# Patient Record
Sex: FEMALE | Race: WHITE | NOT HISPANIC OR LATINO
[De-identification: names, ages, dates, MRNs, and addresses within clinical notes are randomized per-mention and may not be internally consistent; named-entity substitution may affect disease eponyms.]

---

## 2018-07-12 ENCOUNTER — APPOINTMENT (OUTPATIENT)
Dept: HUMAN REPRODUCTION | Facility: CLINIC | Age: 38
End: 2018-07-12
Payer: COMMERCIAL

## 2018-07-12 PROCEDURE — 36415 COLL VENOUS BLD VENIPUNCTURE: CPT

## 2018-07-12 PROCEDURE — 76830 TRANSVAGINAL US NON-OB: CPT

## 2018-07-12 PROCEDURE — 99205 OFFICE O/P NEW HI 60 MIN: CPT

## 2018-07-27 ENCOUNTER — OTHER (OUTPATIENT)
Age: 38
End: 2018-07-27

## 2018-07-30 ENCOUNTER — APPOINTMENT (OUTPATIENT)
Dept: HUMAN REPRODUCTION | Facility: CLINIC | Age: 38
End: 2018-07-30
Payer: COMMERCIAL

## 2018-07-30 ENCOUNTER — TRANSCRIPTION ENCOUNTER (OUTPATIENT)
Age: 38
End: 2018-07-30

## 2018-07-30 PROCEDURE — 81025 URINE PREGNANCY TEST: CPT

## 2018-07-30 PROCEDURE — 76830 TRANSVAGINAL US NON-OB: CPT

## 2018-07-30 PROCEDURE — 36415 COLL VENOUS BLD VENIPUNCTURE: CPT

## 2018-07-30 PROCEDURE — 99214 OFFICE O/P EST MOD 30 MIN: CPT | Mod: 25

## 2018-08-03 ENCOUNTER — APPOINTMENT (OUTPATIENT)
Dept: HUMAN REPRODUCTION | Facility: CLINIC | Age: 38
End: 2018-08-03
Payer: COMMERCIAL

## 2018-08-03 PROCEDURE — 99213 OFFICE O/P EST LOW 20 MIN: CPT | Mod: 25

## 2018-08-03 PROCEDURE — 76830 TRANSVAGINAL US NON-OB: CPT

## 2018-08-03 PROCEDURE — 36415 COLL VENOUS BLD VENIPUNCTURE: CPT

## 2018-08-30 ENCOUNTER — APPOINTMENT (OUTPATIENT)
Dept: HUMAN REPRODUCTION | Facility: CLINIC | Age: 38
End: 2018-08-30
Payer: COMMERCIAL

## 2018-08-30 PROCEDURE — 99215 OFFICE O/P EST HI 40 MIN: CPT | Mod: 25

## 2018-08-30 PROCEDURE — 76830 TRANSVAGINAL US NON-OB: CPT

## 2018-08-30 PROCEDURE — 36415 COLL VENOUS BLD VENIPUNCTURE: CPT

## 2018-09-05 ENCOUNTER — OTHER (OUTPATIENT)
Age: 38
End: 2018-09-05

## 2018-09-05 ENCOUNTER — APPOINTMENT (OUTPATIENT)
Dept: HUMAN REPRODUCTION | Facility: CLINIC | Age: 38
End: 2018-09-05
Payer: COMMERCIAL

## 2018-09-05 PROCEDURE — 76830 TRANSVAGINAL US NON-OB: CPT

## 2018-09-05 PROCEDURE — 99213 OFFICE O/P EST LOW 20 MIN: CPT | Mod: 25

## 2018-09-05 PROCEDURE — 36415 COLL VENOUS BLD VENIPUNCTURE: CPT

## 2018-09-10 ENCOUNTER — APPOINTMENT (OUTPATIENT)
Dept: HUMAN REPRODUCTION | Facility: CLINIC | Age: 38
End: 2018-09-10
Payer: COMMERCIAL

## 2018-09-10 PROCEDURE — 36415 COLL VENOUS BLD VENIPUNCTURE: CPT

## 2018-09-10 PROCEDURE — 76830 TRANSVAGINAL US NON-OB: CPT

## 2018-09-10 PROCEDURE — 99213 OFFICE O/P EST LOW 20 MIN: CPT | Mod: 25

## 2018-09-12 ENCOUNTER — APPOINTMENT (OUTPATIENT)
Dept: HUMAN REPRODUCTION | Facility: CLINIC | Age: 38
End: 2018-09-12

## 2018-09-17 ENCOUNTER — APPOINTMENT (OUTPATIENT)
Dept: HUMAN REPRODUCTION | Facility: CLINIC | Age: 38
End: 2018-09-17

## 2018-09-20 ENCOUNTER — APPOINTMENT (OUTPATIENT)
Dept: ANTEPARTUM | Facility: CLINIC | Age: 38
End: 2018-09-20

## 2018-09-20 ENCOUNTER — APPOINTMENT (OUTPATIENT)
Dept: MATERNAL FETAL MEDICINE | Facility: CLINIC | Age: 38
End: 2018-09-20
Payer: COMMERCIAL

## 2018-09-20 ENCOUNTER — ASOB RESULT (OUTPATIENT)
Age: 38
End: 2018-09-20

## 2018-09-20 VITALS
HEIGHT: 62 IN | DIASTOLIC BLOOD PRESSURE: 86 MMHG | WEIGHT: 223.6 LBS | SYSTOLIC BLOOD PRESSURE: 122 MMHG | BODY MASS INDEX: 41.15 KG/M2

## 2018-09-20 PROCEDURE — 99242 OFF/OP CONSLTJ NEW/EST SF 20: CPT | Mod: 25

## 2018-11-19 ENCOUNTER — APPOINTMENT (OUTPATIENT)
Dept: HUMAN REPRODUCTION | Facility: CLINIC | Age: 38
End: 2018-11-19
Payer: COMMERCIAL

## 2018-11-19 PROCEDURE — 36415 COLL VENOUS BLD VENIPUNCTURE: CPT

## 2018-11-19 PROCEDURE — 99213 OFFICE O/P EST LOW 20 MIN: CPT | Mod: 25

## 2018-11-19 PROCEDURE — 76830 TRANSVAGINAL US NON-OB: CPT

## 2018-11-27 ENCOUNTER — APPOINTMENT (OUTPATIENT)
Dept: HUMAN REPRODUCTION | Facility: CLINIC | Age: 38
End: 2018-11-27
Payer: COMMERCIAL

## 2018-11-27 PROCEDURE — 58340 CATHETER FOR HYSTEROGRAPHY: CPT

## 2018-11-27 PROCEDURE — 99213 OFFICE O/P EST LOW 20 MIN: CPT | Mod: 25

## 2018-11-27 PROCEDURE — 76831 ECHO EXAM UTERUS: CPT

## 2018-12-18 ENCOUNTER — APPOINTMENT (OUTPATIENT)
Dept: HUMAN REPRODUCTION | Facility: CLINIC | Age: 38
End: 2018-12-18
Payer: COMMERCIAL

## 2018-12-18 PROCEDURE — 99213 OFFICE O/P EST LOW 20 MIN: CPT | Mod: 25

## 2018-12-18 PROCEDURE — 36415 COLL VENOUS BLD VENIPUNCTURE: CPT

## 2018-12-18 PROCEDURE — 76830 TRANSVAGINAL US NON-OB: CPT

## 2018-12-28 ENCOUNTER — APPOINTMENT (OUTPATIENT)
Dept: HUMAN REPRODUCTION | Facility: CLINIC | Age: 38
End: 2018-12-28
Payer: COMMERCIAL

## 2018-12-28 PROCEDURE — 76830 TRANSVAGINAL US NON-OB: CPT

## 2018-12-28 PROCEDURE — 99213 OFFICE O/P EST LOW 20 MIN: CPT | Mod: 25

## 2018-12-28 PROCEDURE — 36415 COLL VENOUS BLD VENIPUNCTURE: CPT

## 2018-12-29 ENCOUNTER — APPOINTMENT (OUTPATIENT)
Dept: HUMAN REPRODUCTION | Facility: CLINIC | Age: 38
End: 2018-12-29
Payer: COMMERCIAL

## 2018-12-29 PROCEDURE — 76830 TRANSVAGINAL US NON-OB: CPT

## 2018-12-29 PROCEDURE — 58322 ARTIFICIAL INSEMINATION: CPT

## 2018-12-29 PROCEDURE — 89261 SPERM ISOLATION COMPLEX: CPT

## 2018-12-29 PROCEDURE — 99213 OFFICE O/P EST LOW 20 MIN: CPT | Mod: 25

## 2019-01-17 ENCOUNTER — APPOINTMENT (OUTPATIENT)
Dept: HUMAN REPRODUCTION | Facility: CLINIC | Age: 39
End: 2019-01-17
Payer: COMMERCIAL

## 2019-01-17 PROCEDURE — 99213 OFFICE O/P EST LOW 20 MIN: CPT | Mod: 25

## 2019-01-17 PROCEDURE — 36415 COLL VENOUS BLD VENIPUNCTURE: CPT

## 2019-01-17 PROCEDURE — 76817 TRANSVAGINAL US OBSTETRIC: CPT

## 2019-01-22 ENCOUNTER — APPOINTMENT (OUTPATIENT)
Dept: HUMAN REPRODUCTION | Facility: CLINIC | Age: 39
End: 2019-01-22
Payer: COMMERCIAL

## 2019-01-22 PROCEDURE — 36415 COLL VENOUS BLD VENIPUNCTURE: CPT

## 2019-01-22 PROCEDURE — 76817 TRANSVAGINAL US OBSTETRIC: CPT

## 2019-01-22 PROCEDURE — 99213 OFFICE O/P EST LOW 20 MIN: CPT | Mod: 25

## 2019-01-29 ENCOUNTER — APPOINTMENT (OUTPATIENT)
Dept: HUMAN REPRODUCTION | Facility: CLINIC | Age: 39
End: 2019-01-29
Payer: COMMERCIAL

## 2019-01-29 PROCEDURE — 76817 TRANSVAGINAL US OBSTETRIC: CPT

## 2019-01-29 PROCEDURE — 99213 OFFICE O/P EST LOW 20 MIN: CPT | Mod: 25

## 2019-01-29 PROCEDURE — 36415 COLL VENOUS BLD VENIPUNCTURE: CPT

## 2023-06-23 ENCOUNTER — APPOINTMENT (OUTPATIENT)
Dept: BARIATRICS/WEIGHT MGMT | Facility: CLINIC | Age: 43
End: 2023-06-23
Payer: COMMERCIAL

## 2023-06-23 VITALS — WEIGHT: 245 LBS | BODY MASS INDEX: 43.41 KG/M2 | HEIGHT: 63 IN

## 2023-06-23 DIAGNOSIS — Z87.42 PERSONAL HISTORY OF OTHER DISEASES OF THE FEMALE GENITAL TRACT: ICD-10-CM

## 2023-06-23 PROCEDURE — 99205 OFFICE O/P NEW HI 60 MIN: CPT | Mod: 95

## 2023-06-23 RX ORDER — CLOMIPHENE CITRATE 50 MG/1
50 TABLET ORAL DAILY
Qty: 5 | Refills: 0 | Status: DISCONTINUED | COMMUNITY
Start: 2018-08-30 | End: 2023-06-23

## 2023-06-23 RX ORDER — PEN NEEDLE, DIABETIC 32 GX 1/6"
32G X 4 MM NEEDLE, DISPOSABLE MISCELLANEOUS
Qty: 90 | Refills: 1 | Status: DISCONTINUED | COMMUNITY
Start: 2023-06-23 | End: 2023-06-23

## 2023-06-23 RX ORDER — LEVOTHYROXINE SODIUM 0.05 MG/1
50 TABLET ORAL
Qty: 30 | Refills: 6 | Status: DISCONTINUED | COMMUNITY
Start: 2018-07-27 | End: 2023-06-23

## 2023-06-23 RX ORDER — CHORIOGONADOTROPIN ALFA 250 UG/.5ML
250 INJECTION, SOLUTION SUBCUTANEOUS
Qty: 1 | Refills: 0 | Status: DISCONTINUED | COMMUNITY
Start: 2018-09-05 | End: 2023-06-23

## 2023-06-23 RX ORDER — CHORIOGONADOTROPIN ALFA 250 UG/.5ML
250 INJECTION, SOLUTION SUBCUTANEOUS
Qty: 1 | Refills: 1 | Status: DISCONTINUED | COMMUNITY
Start: 2018-11-19 | End: 2023-06-23

## 2023-06-23 RX ORDER — CLOMIPHENE CITRATE 50 MG/1
50 TABLET ORAL DAILY
Qty: 5 | Refills: 0 | Status: DISCONTINUED | COMMUNITY
Start: 2018-11-19 | End: 2023-06-23

## 2023-06-23 NOTE — REASON FOR VISIT
[Home] : at home, [unfilled] , at the time of the visit. [Medical Office: ([facility])___] : at the medical office located in  [Patient] : the patient [Initial Evaluation] : an initial evaluation

## 2023-06-23 NOTE — HISTORY OF PRESENT ILLNESS
[FreeTextEntry1] : Bariatric surgery history: none\par Obesity co- morbidities: gerd on ppi\par Comorbidities improved or resolved: none\par Anti-obesity medications: none\par Obesity medication side effects: none\par \par PATIENT WAS NOTIFIED THAT ANYTHING WE DISCUSSED IN OUR MEETING TODAY MAY BE REFLECTED IN WRITING IN THE VISIT NOTE WHICH WILL BE AVAILABLE TO OTHER MEDICAL PROVIDERS TO REVIEW AS PART OF ROUTINE PATIENT CARE.  PATIENT VERBALLY AGREED. \par \par Ms. LYLY GOMEZ is a 42 year year old female who presents for evaluation and treatment of Class 3 obesity. 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  \par \par Had labs done in May, 5.8%.  new diagnosis\par \par Weight History:\par Lowest adult weight: 135\par Highest adult weight: 250\par \par Has lost 20 pounds over the past year.\par \par Obesity began in college.  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  Weight gain has occurred with: in college, started lexapro and had difficult break up and gained weight.   Lost 65 lbs - with daily cardio - running, and some strength training - kickboxing classes, when she was in college after stopping lexapro down to 135 from 200. With work/med school, and pregnant w twins, max 250#.  In the past year, has gone from 245 to 220s.  Significant low back pain since 3rd baby, went to PT for a while.  \par In June last year 245, down to 220 with WW, and then went on vacation, and then "let loose" and has regained most of the weight.  \par \par Started taking wegovy 0.25mg x 5 weeks, and had improved cravings at night significantly.   lost about 4 lbs.  then was out of supply. \par \par Past weight loss attempts include:  WW, phentermine. These have produced a maximum of 25 pounds of weight loss.  \par Anti-obesity medications in the past: phentermine - no weight loss while taking consistently.  Wegovy - since 5 weeks ago, 0.25mg.  \par \par Sleep: 5-8 hours.  irregular due to shifts.  +snore.  occasionally naps. \par \par Has 1-2 (varies) regular meals a day.  Irregular eating habits\par \par Diet history:\par wakes up at:\par B: eat minimally during the day.  dry cereal - puffins.  or yogurt.  \par L: grilled cheese or a few teaspoons of peanut butter\par D: starbucks - egg white bites, or ramen, or Romansh food\par \par when she works - yogurt oikos 20g protein, and little roll ramirez w PB&J and coffee.  with think thin 150 margo, 10g protein.  after work, fast food or ice cream\par \par Doesn't cook anymore. \par \par snacks: yes\par eating after dinner: yes ice cream, cookies, chocolate\par overeating episodes: yes, improved on wegovy. 3-4 days a week. \par \par Sodas/fast food/processed foods: 2-3 times a week \par \par Water intake per day: 2 cups per day\par coffee 2 cups per day\par soda 1-2 cups per day\par seltzer water ex. eulogio - 1-2 per day. \par \par Physical activity:\par Patient enjoys: gym, biking\par Current physical activity: daily activities only\par \par

## 2023-06-23 NOTE — ASSESSMENT
[FreeTextEntry1] : 42 y.o F w Class 3 obesity, preDM. presents for weight management\par \par - discussed incr water intake\par - trying to wait and see if  will come back to more shifts or will have to find another to help out\par - wanting to try saxenda- will send\par - discussed eating more meals during the day will be very crucial to decr binge eating behaviors at night.  can see Dr. Pineda, will email handouts as well\par - incr activity as tolerated but above is priority\par - sleep study - defers at this time, 3/8 risk factors\par \par f/u 6 weeks or next avail

## 2023-07-26 ENCOUNTER — APPOINTMENT (OUTPATIENT)
Dept: BARIATRICS/WEIGHT MGMT | Facility: CLINIC | Age: 43
End: 2023-07-26
Payer: COMMERCIAL

## 2023-07-26 VITALS — BODY MASS INDEX: 41.82 KG/M2 | HEIGHT: 63 IN | WEIGHT: 236 LBS

## 2023-07-26 PROCEDURE — 97802 MEDICAL NUTRITION INDIV IN: CPT | Mod: 95

## 2023-08-03 ENCOUNTER — APPOINTMENT (OUTPATIENT)
Dept: BARIATRICS/WEIGHT MGMT | Facility: CLINIC | Age: 43
End: 2023-08-03
Payer: COMMERCIAL

## 2023-08-03 ENCOUNTER — OUTPATIENT (OUTPATIENT)
Dept: OUTPATIENT SERVICES | Facility: HOSPITAL | Age: 43
LOS: 1 days | End: 2023-08-03
Payer: COMMERCIAL

## 2023-08-03 VITALS — BODY MASS INDEX: 41.1 KG/M2 | WEIGHT: 232 LBS

## 2023-08-03 DIAGNOSIS — E66.9 OBESITY, UNSPECIFIED: ICD-10-CM

## 2023-08-03 DIAGNOSIS — R73.03 PREDIABETES: ICD-10-CM

## 2023-08-03 PROCEDURE — 99214 OFFICE O/P EST MOD 30 MIN: CPT | Mod: 95

## 2023-08-03 PROCEDURE — G0463: CPT

## 2023-08-03 NOTE — HISTORY OF PRESENT ILLNESS
[FreeTextEntry1] : Bariatric surgery history: none Obesity co- morbidities: gerd on ppi Comorbidities improved or resolved: none Anti-obesity medications: saxenda Obesity medication side effects: none  PATIENT WAS NOTIFIED THAT ANYTHING WE DISCUSSED IN OUR MEETING TODAY MAY BE REFLECTED IN WRITING IN THE VISIT NOTE WHICH WILL BE AVAILABLE TO OTHER MEDICAL PROVIDERS TO REVIEW AS PART OF ROUTINE PATIENT CARE.  PATIENT VERBALLY AGREED.   Ms. LYLY GOMEZ is a 42 year year old female who presents for evaluation and treatment of Class 3 obesity.   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    Had labs done in May, 5.8%.  new diagnosis  Weight History: Lowest adult weight: 135 Highest adult weight: 250  Has lost 20 pounds over the past year.  Interim: - started saxenda around July 4th - she's currently on 1.8mg.  On 2.4mg she was having more gi side effects - seeing progress - spoke to RDN  Obesity began in college.  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  Weight gain has occurred with: in college, started lexapro and had difficult break up and gained weight.   Lost 65 lbs - with daily cardio - running, and some strength training - kickboxing classes, when she was in college after stopping lexapro down to 135 from 200. With work/med school, and pregnant w twins, max 250#.  In the past year, has gone from 245 to 220s.  Significant low back pain since 3rd baby, went to PT for a while.   In June last year 245, down to 220 with WW, and then went on vacation, and then "let loose" and has regained most of the weight.    Started taking wegovy 0.25mg x 5 weeks, and had improved cravings at night significantly.   lost about 4 lbs.  then was out of supply.   Past weight loss attempts include:  WW, phentermine. These have produced a maximum of 25 pounds of weight loss.   Anti-obesity medications in the past: phentermine - no weight loss while taking consistently.  Wegovy - since 5 weeks ago, 0.25mg.    Sleep: 5-8 hours.  irregular due to shifts.  +snore.  occasionally naps.   Has 1-2 (varies) regular meals a day.  Irregular eating habits  Diet history: wakes up at: B: eat minimally during the day.  dry cereal - puffins.  or yogurt.   L: grilled cheese or a few teaspoons of peanut butter D: starbucks - egg white bites, or ramen, or [place] food  when she works - yogurt oikos 20g protein, and little roll ramirez w PB&J and coffee.  with think thin 150 margo, 10g protein.  after work, fast food or ice cream  Doesn't cook anymore.   snacks: yes eating after dinner: yes ice cream, cookies, chocolate overeating episodes: yes, improved on wegovy. 3-4 days a week.   Sodas/fast food/processed foods: 2-3 times a week   Water intake per day: 2 cups per day coffee 2 cups per day soda 1-2 cups per day seltzer water ex. eulogio - 1-2 per day.   Physical activity: Patient enjoys: gym, biking Current physical activity: daily activities only

## 2023-08-03 NOTE — ASSESSMENT
[FreeTextEntry1] : 42 y.o F w Class 3 obesity, preDM. presents for weight management  - saxenda 1.8 mg.  - discussed incr water intake - trying to wait and see if  will come back to more shifts or will have to find another to help out - discussed eating more meals during the day will be very crucial to decr binge eating behaviors at night.  can see Dr. Pineda - she's been doing that more often, definitely breakfast - incr activity as tolerated but above is priority - sleep study - defers at this time, 3/8 risk factors  f/u 6 weeks or next avail

## 2023-08-03 NOTE — REASON FOR VISIT
[Home] : at home, [unfilled] , at the time of the visit. [Medical Office: ([facility])___] : at the medical office located in  [Patient] : the patient [Follow-Up] : a follow-up visit

## 2023-08-04 DIAGNOSIS — I10 ESSENTIAL (PRIMARY) HYPERTENSION: ICD-10-CM

## 2023-08-14 ENCOUNTER — APPOINTMENT (OUTPATIENT)
Dept: BARIATRICS/WEIGHT MGMT | Facility: CLINIC | Age: 43
End: 2023-08-14
Payer: COMMERCIAL

## 2023-08-14 VITALS — BODY MASS INDEX: 41.64 KG/M2 | HEIGHT: 63 IN | WEIGHT: 235 LBS

## 2023-08-14 DIAGNOSIS — Z78.9 OTHER SPECIFIED HEALTH STATUS: ICD-10-CM

## 2023-08-14 PROCEDURE — 90791 PSYCH DIAGNOSTIC EVALUATION: CPT | Mod: 95

## 2023-08-14 NOTE — DISCUSSION/SUMMARY
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 Humphreys for treatment of BED. [de-identified] : BED [de-identified] : cessation of binge eating

## 2023-08-14 NOTE — REASON FOR VISIT
[Home] : at home, [unfilled] , at the time of the visit. [Other Location: e.g. Home (Enter Location, City,State)___] : at [unfilled] [Patient] : the patient [Initial Consult] : an initial consult for [Morbid Obesity (BMI>40)] : morbid obesity (bmi>40) [Referring By:  ___] : ~Dino Ln~ was referred for psychological evaluation by Dr. HUTSON [de-identified] : for evaluation and tx of psyc sxs related to obesity [de-identified] : Confidentiality and its limitations were explained.

## 2023-08-14 NOTE — HISTORY OF PRESENT ILLNESS
[Binge Eating] : binge eating [Emotional Eating] : emotional eating  [Mindless Eating] : mindless eating [Quantity Eating] : quantity eating [Poor Choices] : poor choices [de-identified] : Pt referred by Dr. Humphreys.  Started Saxenda x 1 month ago and has lost 6 lbs.  Reports binge eating at night which has lessened slightly on Saxenda.  Pt's motivation for weight loss is to avoid health issues and "to look better".   Per pt, her highest weight was 245 lbs most recently a few months ago and her lowest weight was 130 lbs at 29 yrs old.   Her stated goal weight is 150 lbs. [de-identified] : since high school, at times every night.  A typical binge would include a package of cookies or a pint or quart of ice cream.  Endorses feeling out of control, denies eating rapidly, endorses eating when not hungry and until uncomfortably full, eating alone or in front of  only because of shame, and feels disgusted afterwards. [de-identified] : A current typical day of eating is reported as follows: B: skips L:  half a sandwich D at 8pm:  eats something late, orders take out, sometimes has dinner and then snacks Drinks water, seltzer and coffee.

## 2023-08-14 NOTE — SOCIAL HISTORY
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

## 2023-08-14 NOTE — CURRENT PSYCHIATRIC SYMPTOMS
[Depressed Mood] : no depressed mood [Euphoria] : no euphoria [Dec Need For Sleep] : no decreased need for sleep [Thought Disorder] : ~T a thought disorder was not noted [Excessive Worry] : no excessive worries [Panic] : no panic disorder [de-identified] : poor sleep due to changing work schedule [FreeTextEntry1] : Saw a therapist in her 20s for depression secondary to a break up.  Took Lexapro for less than a year during that time.  Denies all other psyc tx hx.

## 2023-08-14 NOTE — PHYSICAL EXAM
[Normal] : good [AH] : no auditory hallucinations [VH] : no visual hallucinations [Suicidal Ideation] : no suicidal ideation [Homicidal Ideation] : no homicidal ideation [FreeTextEntry8] : "decent"

## 2023-09-05 ENCOUNTER — APPOINTMENT (OUTPATIENT)
Dept: BARIATRICS/WEIGHT MGMT | Facility: CLINIC | Age: 43
End: 2023-09-05
Payer: COMMERCIAL

## 2023-09-05 VITALS — WEIGHT: 231 LBS | BODY MASS INDEX: 40.93 KG/M2 | HEIGHT: 63 IN

## 2023-09-05 PROCEDURE — 90832 PSYTX W PT 30 MINUTES: CPT | Mod: 95

## 2023-09-05 PROCEDURE — 97803 MED NUTRITION INDIV SUBSEQ: CPT | Mod: 95

## 2023-09-05 NOTE — DISCUSSION/SUMMARY
[Behavior plan developed] : Behavior plan developed [Coping skills training to overcome social/emotional barriers to adherence] : Coping skills training to overcome social/emotional barriers to adherence [Conventional grooming and hygiene] : Conventional grooming and hygiene [Calm, cooperative] : Calm, cooperative [No unusual behaviors, movements, etc.] : No unusual behaviors, movements, etc. 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 Humphreys for treatment of BED.  Prescribed Saxenda since July 2023. [de-identified] : BED [FreeTextEntry3] : Follow up with writer helene 4 wks [de-identified] : cessation of binge eating

## 2023-09-05 NOTE — REASON FOR VISIT
[Follow-Up Visit] : a follow-up visit for [Morbid Obesity (BMI>40)] : morbid obesity (bmi>40) [Home] : at home, [unfilled] , at the time of the visit. [Other Location: e.g. Home (Enter Location, City,State)___] : at [unfilled] [Patient] : the patient [Referring By:  ___] : ~Dino Ln~ was referred for psychological evaluation by Dr. HUTSON [de-identified] : for evaluation and tx of psyc sxs related to obesity [de-identified] : Confidentiality and its limitations were explained.

## 2023-09-28 ENCOUNTER — APPOINTMENT (OUTPATIENT)
Dept: BARIATRICS/WEIGHT MGMT | Facility: CLINIC | Age: 43
End: 2023-09-28
Payer: COMMERCIAL

## 2023-09-28 ENCOUNTER — OUTPATIENT (OUTPATIENT)
Dept: OUTPATIENT SERVICES | Facility: HOSPITAL | Age: 43
LOS: 1 days | End: 2023-09-28
Payer: COMMERCIAL

## 2023-09-28 VITALS — BODY MASS INDEX: 41.1 KG/M2 | WEIGHT: 232 LBS

## 2023-09-28 DIAGNOSIS — I10 ESSENTIAL (PRIMARY) HYPERTENSION: ICD-10-CM

## 2023-09-28 PROCEDURE — 99214 OFFICE O/P EST MOD 30 MIN: CPT | Mod: 95

## 2023-09-28 PROCEDURE — G0463: CPT

## 2023-10-02 DIAGNOSIS — E66.9 OBESITY, UNSPECIFIED: ICD-10-CM

## 2023-10-02 DIAGNOSIS — R73.03 PREDIABETES: ICD-10-CM

## 2023-10-11 ENCOUNTER — APPOINTMENT (OUTPATIENT)
Dept: BARIATRICS/WEIGHT MGMT | Facility: CLINIC | Age: 43
End: 2023-10-11
Payer: COMMERCIAL

## 2023-10-11 VITALS — BODY MASS INDEX: 40.4 KG/M2 | WEIGHT: 228 LBS | HEIGHT: 63 IN

## 2023-10-11 PROCEDURE — 90832 PSYTX W PT 30 MINUTES: CPT | Mod: 95,GT

## 2023-11-27 ENCOUNTER — APPOINTMENT (OUTPATIENT)
Dept: BARIATRICS/WEIGHT MGMT | Facility: CLINIC | Age: 43
End: 2023-11-27

## 2023-11-27 ENCOUNTER — APPOINTMENT (OUTPATIENT)
Dept: BARIATRICS/WEIGHT MGMT | Facility: CLINIC | Age: 43
End: 2023-11-27
Payer: COMMERCIAL

## 2023-11-27 ENCOUNTER — OUTPATIENT (OUTPATIENT)
Dept: OUTPATIENT SERVICES | Facility: HOSPITAL | Age: 43
LOS: 1 days | End: 2023-11-27
Payer: COMMERCIAL

## 2023-11-27 VITALS — WEIGHT: 222 LBS | BODY MASS INDEX: 39.33 KG/M2

## 2023-11-27 DIAGNOSIS — I10 ESSENTIAL (PRIMARY) HYPERTENSION: ICD-10-CM

## 2023-11-27 PROCEDURE — G0463: CPT

## 2023-11-27 PROCEDURE — 99214 OFFICE O/P EST MOD 30 MIN: CPT | Mod: 95

## 2023-11-27 RX ORDER — LIRAGLUTIDE 6 MG/ML
18 INJECTION, SOLUTION SUBCUTANEOUS DAILY
Qty: 1 | Refills: 2 | Status: DISCONTINUED | COMMUNITY
Start: 2023-06-23 | End: 2023-11-27

## 2023-12-05 DIAGNOSIS — E66.01 MORBID (SEVERE) OBESITY DUE TO EXCESS CALORIES: ICD-10-CM

## 2023-12-05 DIAGNOSIS — R73.03 PREDIABETES: ICD-10-CM

## 2023-12-12 RX ORDER — PEN NEEDLE, DIABETIC 29 G X1/2"
32G X 4 MM NEEDLE, DISPOSABLE MISCELLANEOUS
Qty: 90 | Refills: 1 | Status: DISCONTINUED | COMMUNITY
Start: 2023-06-23 | End: 2023-12-12

## 2023-12-12 RX ORDER — SEMAGLUTIDE 1.7 MG/.75ML
1.7 INJECTION, SOLUTION SUBCUTANEOUS
Qty: 3 | Refills: 0 | Status: DISCONTINUED | COMMUNITY
Start: 2023-09-28 | End: 2023-12-12

## 2024-01-09 ENCOUNTER — APPOINTMENT (OUTPATIENT)
Dept: BARIATRICS/WEIGHT MGMT | Facility: CLINIC | Age: 44
End: 2024-01-09
Payer: COMMERCIAL

## 2024-01-09 ENCOUNTER — TRANSCRIPTION ENCOUNTER (OUTPATIENT)
Age: 44
End: 2024-01-09

## 2024-01-09 VITALS — WEIGHT: 216 LBS | BODY MASS INDEX: 38.27 KG/M2 | HEIGHT: 63 IN

## 2024-01-09 PROCEDURE — 99214 OFFICE O/P EST MOD 30 MIN: CPT | Mod: 95

## 2024-01-11 NOTE — ASSESSMENT
[FreeTextEntry1] : Bariatric surgery history: none Obesity co- morbidities: gerd on ppi Comorbidities improved or resolved: none Anti-obesity medications: wegovy Obesity medication side effects: none  Plan:  Discussed whole food, high fiber, lean animal protein would reduce animal protein to 4-6 ounce serving size a day  keep food journal  avoid added fat, sugar, refined carbohydrates  encouraged regular exercise- discussed planning in advance will order zepbound 5mg and dc wegovy, as patient is unable to tolerate.  f/u 2-3 weeks

## 2024-01-11 NOTE — REASON FOR VISIT
[Home] : at home, [unfilled] , at the time of the visit. [Other Location: e.g. Home (Enter Location, City,State)___] : at [unfilled] [Patient] : the patient [Self] : self [Follow-Up] : a follow-up visit

## 2024-01-11 NOTE — HISTORY OF PRESENT ILLNESS
[FreeTextEntry1] : This is a 43 year old female  being seen obesity followup visit.    Patient's weight down  She was sick for 2-3 weeks but all has resolved   Taking wegovy 1.7mg Severe fatigue and nausea the day after injection.     She is doing Factor meals Patient does not have time to cook.  with 3 young children  Exercise is sporadic.

## 2024-02-07 ENCOUNTER — APPOINTMENT (OUTPATIENT)
Dept: BARIATRICS/WEIGHT MGMT | Facility: CLINIC | Age: 44
End: 2024-02-07
Payer: COMMERCIAL

## 2024-02-07 VITALS — BODY MASS INDEX: 37.74 KG/M2 | WEIGHT: 213 LBS | HEIGHT: 63 IN

## 2024-02-07 PROCEDURE — 99214 OFFICE O/P EST MOD 30 MIN: CPT | Mod: 95

## 2024-02-07 NOTE — HISTORY OF PRESENT ILLNESS
[FreeTextEntry1] : This is a 43 year old female  being seen obesity followup visit.    Patient's weight down 3 pounds  she has started zepbound 5mg 2 weeks ago  after first injection noted back aches and posterior lymph nodes for 24 hours, denies fever/chills, cough, sob Patient took 2nd injection yesterday    Started thistle, really enjoying the meals, getting in more fiber and feels   kept a food journal tends to snack while watching tv at night ie: chips and cookies   She started walking this week, especially nice days  started wearing apple watch, tracking steps

## 2024-02-07 NOTE — ASSESSMENT
[FreeTextEntry1] : Bariatric surgery history: none Obesity co- morbidities: gerd on ppi Comorbidities improved or resolved: none Anti-obesity medications: wegovy Obesity medication side effects: none  Plan:  Discussed whole food, high fiber, lean animal protein would reduce animal protein to 4-6 ounce serving size a day  keep food journal  avoid added fat, sugar, refined carbohydrates  encouraged regular exercise continue zepbound 5mg and monitor for any immune side effects, patient will reach out with update. if tolerating, will advise to increase to 7.5mg  change night time behavior- drink tea, relax in a different room, add breathing stress reduction techniques   f/u 2-3 weeks

## 2024-02-15 ENCOUNTER — TRANSCRIPTION ENCOUNTER (OUTPATIENT)
Age: 44
End: 2024-02-15

## 2024-03-06 ENCOUNTER — OUTPATIENT (OUTPATIENT)
Dept: OUTPATIENT SERVICES | Facility: HOSPITAL | Age: 44
LOS: 1 days | End: 2024-03-06
Payer: COMMERCIAL

## 2024-03-06 ENCOUNTER — APPOINTMENT (OUTPATIENT)
Dept: BARIATRICS/WEIGHT MGMT | Facility: CLINIC | Age: 44
End: 2024-03-06
Payer: COMMERCIAL

## 2024-03-06 VITALS — WEIGHT: 208 LBS | BODY MASS INDEX: 36.86 KG/M2 | HEIGHT: 63 IN

## 2024-03-06 DIAGNOSIS — I10 ESSENTIAL (PRIMARY) HYPERTENSION: ICD-10-CM

## 2024-03-06 PROCEDURE — 99213 OFFICE O/P EST LOW 20 MIN: CPT | Mod: 95

## 2024-03-06 PROCEDURE — G0463: CPT

## 2024-03-07 NOTE — ASSESSMENT
[FreeTextEntry1] : Bariatric surgery history: none Obesity co- morbidities: gerd on ppi Comorbidities improved or resolved: none Anti-obesity medications: wegovy Obesity medication side effects: none  Plan:  Discussed whole food, high fiber, lean animal protein avoid red meat avoid processed/packaged foods keep food journal  avoid added fat, sugar, refined carbohydrates  discussed foods she can prepare at home to avoid eating out  encouraged regular exercise continue zepbound  7.5mg for now   f/u 4-6weeks

## 2024-03-07 NOTE — HISTORY OF PRESENT ILLNESS
[FreeTextEntry1] : This is a 43 year old female  being seen obesity followup visit.    Patient's weight down 5 pounds  increased zepbound to 7.5mg she noted some bloating and belching, but still says it's tolerable   yesterday food recall B: Greek from [employer] L: a couple bites of salad  D: salad with chicken   tracking steps, but has not been able to go for regimented exercise  5k steps on average

## 2024-03-20 DIAGNOSIS — R73.03 PREDIABETES: ICD-10-CM

## 2024-03-20 DIAGNOSIS — E66.01 MORBID (SEVERE) OBESITY DUE TO EXCESS CALORIES: ICD-10-CM

## 2024-03-27 ENCOUNTER — TRANSCRIPTION ENCOUNTER (OUTPATIENT)
Age: 44
End: 2024-03-27

## 2024-04-17 ENCOUNTER — APPOINTMENT (OUTPATIENT)
Dept: BARIATRICS/WEIGHT MGMT | Facility: CLINIC | Age: 44
End: 2024-04-17
Payer: COMMERCIAL

## 2024-04-17 ENCOUNTER — OUTPATIENT (OUTPATIENT)
Dept: OUTPATIENT SERVICES | Facility: HOSPITAL | Age: 44
LOS: 1 days | End: 2024-04-17
Payer: COMMERCIAL

## 2024-04-17 VITALS — HEIGHT: 63 IN | WEIGHT: 200 LBS | BODY MASS INDEX: 35.44 KG/M2

## 2024-04-17 DIAGNOSIS — I10 ESSENTIAL (PRIMARY) HYPERTENSION: ICD-10-CM

## 2024-04-17 PROCEDURE — G0463: CPT

## 2024-04-17 PROCEDURE — 99214 OFFICE O/P EST MOD 30 MIN: CPT | Mod: 95

## 2024-04-17 NOTE — ASSESSMENT
[FreeTextEntry1] : Bariatric surgery history: none Obesity co- morbidities: gerd on ppi Comorbidities improved or resolved: none Anti-obesity medications: wegovy Obesity medication side effects: none  Plan:  Discussed whole food, high fiber, lean animal protein would reduce animal protein to 4-6 ounce serving size a day  keep food journal  avoid added fat, sugar, refined carbohydrates  try to stop eating by 8-9pm daily  encouraged regular exercise advised to continue wegovy 1.7mg   f/u 4-6 weeks

## 2024-04-17 NOTE — HISTORY OF PRESENT ILLNESS
[FreeTextEntry1] : This is a 43 year old female  being seen obesity followup visit.    Patient's weight down 8 pounds  Unable to get zepbound, had wegovy 1.7mg  so she restarted that. tolerating well, no adverse side effects slight fatigue for 1 day after injection     emailed food journal she does not typically prepare food  will bring snack like foods to work.  some nights she gets home after midnight from work and she will snack while watching tv at night ie: chips and cookies   On work nights, will get 5k steps on non work days closer to 10k- can go for walks

## 2024-04-23 DIAGNOSIS — R73.03 PREDIABETES: ICD-10-CM

## 2024-04-23 DIAGNOSIS — E66.01 MORBID (SEVERE) OBESITY DUE TO EXCESS CALORIES: ICD-10-CM

## 2024-04-23 DIAGNOSIS — F50.81 BINGE EATING DISORDER: ICD-10-CM

## 2024-05-07 ENCOUNTER — TRANSCRIPTION ENCOUNTER (OUTPATIENT)
Age: 44
End: 2024-05-07

## 2024-05-07 RX ORDER — SEMAGLUTIDE 2.4 MG/.75ML
2.4 INJECTION, SOLUTION SUBCUTANEOUS
Qty: 1 | Refills: 2 | Status: ACTIVE | COMMUNITY
Start: 2024-04-17 | End: 1900-01-01

## 2024-05-08 ENCOUNTER — TRANSCRIPTION ENCOUNTER (OUTPATIENT)
Age: 44
End: 2024-05-08

## 2024-05-28 ENCOUNTER — APPOINTMENT (OUTPATIENT)
Dept: BARIATRICS/WEIGHT MGMT | Facility: CLINIC | Age: 44
End: 2024-05-28
Payer: COMMERCIAL

## 2024-05-28 ENCOUNTER — OUTPATIENT (OUTPATIENT)
Dept: OUTPATIENT SERVICES | Facility: HOSPITAL | Age: 44
LOS: 1 days | End: 2024-05-28
Payer: COMMERCIAL

## 2024-05-28 VITALS — BODY MASS INDEX: 35.61 KG/M2 | WEIGHT: 201 LBS | HEIGHT: 63 IN

## 2024-05-28 DIAGNOSIS — I10 ESSENTIAL (PRIMARY) HYPERTENSION: ICD-10-CM

## 2024-05-28 PROCEDURE — 99213 OFFICE O/P EST LOW 20 MIN: CPT | Mod: 95

## 2024-05-28 PROCEDURE — G0463: CPT

## 2024-05-28 RX ORDER — SEMAGLUTIDE 1 MG/.5ML
1 INJECTION, SOLUTION SUBCUTANEOUS
Qty: 1 | Refills: 2 | Status: ACTIVE | COMMUNITY
Start: 2024-05-28 | End: 1900-01-01

## 2024-05-28 NOTE — ASSESSMENT
[FreeTextEntry1] : Bariatric surgery history: none Obesity co- morbidities: gerd on ppi Comorbidities improved or resolved: none Anti-obesity medications: wegovy Obesity medication side effects: none  Plan:  focus on whole food, high fiber, lean animal protein avoid meat and high fat foods  when eating out ask for 1/2 of it to be boxed up  keep food journal  dinner should be smallest meal as early as possible. no eating after dinner  encouraged regular exercise decrease wegovy to 1mg   f/u 4-6 weeks

## 2024-05-28 NOTE — HISTORY OF PRESENT ILLNESS
[FreeTextEntry1] : This is a 43 year old female  being seen obesity followup visit.    Patient's weight up 1 pound, she states she did not have a good month   She stopped wegovy for a couple weeks because she felt it was not working  Restarted last week and felt ill- does not want to increase dose    More eating out, does not always stop eating when she feels full  was not restricting any food this month   notices red meat makes her feel really poor  Less exercise this month, 6k on average

## 2024-05-29 ENCOUNTER — TRANSCRIPTION ENCOUNTER (OUTPATIENT)
Age: 44
End: 2024-05-29

## 2024-06-04 DIAGNOSIS — R73.03 PREDIABETES: ICD-10-CM

## 2024-06-04 DIAGNOSIS — E66.01 MORBID (SEVERE) OBESITY DUE TO EXCESS CALORIES: ICD-10-CM

## 2024-07-01 ENCOUNTER — APPOINTMENT (OUTPATIENT)
Dept: BARIATRICS/WEIGHT MGMT | Facility: CLINIC | Age: 44
End: 2024-07-01
Payer: COMMERCIAL

## 2024-07-01 ENCOUNTER — OUTPATIENT (OUTPATIENT)
Dept: OUTPATIENT SERVICES | Facility: HOSPITAL | Age: 44
LOS: 1 days | End: 2024-07-01
Payer: COMMERCIAL

## 2024-07-01 VITALS — HEIGHT: 63 IN | BODY MASS INDEX: 35.26 KG/M2 | WEIGHT: 199 LBS

## 2024-07-01 DIAGNOSIS — R73.03 PREDIABETES.: ICD-10-CM

## 2024-07-01 DIAGNOSIS — I10 ESSENTIAL (PRIMARY) HYPERTENSION: ICD-10-CM

## 2024-07-01 DIAGNOSIS — E66.01 MORBID (SEVERE) OBESITY DUE TO EXCESS CALORIES: ICD-10-CM

## 2024-07-01 PROCEDURE — 99213 OFFICE O/P EST LOW 20 MIN: CPT | Mod: 95

## 2024-07-01 PROCEDURE — G0463: CPT

## 2024-07-08 DIAGNOSIS — R73.03 PREDIABETES: ICD-10-CM

## 2024-07-08 DIAGNOSIS — E66.01 MORBID (SEVERE) OBESITY DUE TO EXCESS CALORIES: ICD-10-CM

## 2024-08-05 ENCOUNTER — OUTPATIENT (OUTPATIENT)
Dept: OUTPATIENT SERVICES | Facility: HOSPITAL | Age: 44
LOS: 1 days | End: 2024-08-05
Payer: COMMERCIAL

## 2024-08-05 ENCOUNTER — APPOINTMENT (OUTPATIENT)
Dept: BARIATRICS/WEIGHT MGMT | Facility: CLINIC | Age: 44
End: 2024-08-05

## 2024-08-05 DIAGNOSIS — I10 ESSENTIAL (PRIMARY) HYPERTENSION: ICD-10-CM

## 2024-08-05 PROCEDURE — G2211 COMPLEX E/M VISIT ADD ON: CPT | Mod: NC,95

## 2024-08-05 PROCEDURE — G0463: CPT

## 2024-08-05 PROCEDURE — 99213 OFFICE O/P EST LOW 20 MIN: CPT | Mod: 95

## 2024-08-05 NOTE — HISTORY OF PRESENT ILLNESS
[FreeTextEntry1] : This is a 43 year old female  being seen obesity followup visit.   Patient stopped wegovy 2 weeks ago, due to side effects- felt the 1-2 days after injection intolerable.  would like to try zepbound   She does not feel focused  not keeping food journal, no regular exercise

## 2024-08-05 NOTE — ASSESSMENT
[FreeTextEntry1] : Bariatric surgery history: none Obesity co- morbidities: gerd on ppi Comorbidities improved or resolved: none Anti-obesity medications: wegovy Obesity medication side effects: none  Plan:  focus on whole food, high fiber, lean animal protein avoid packaged processed foods keep food journal  3 structured meals  drink 60ounces of water a day  encouraged regular exercise dc wegovy, will try zepbound 5mg as there are less GI side effects associated with zepbound   f/u 4-6 weeks

## 2024-08-12 DIAGNOSIS — E66.01 MORBID (SEVERE) OBESITY DUE TO EXCESS CALORIES: ICD-10-CM

## 2024-08-12 DIAGNOSIS — R73.03 PREDIABETES: ICD-10-CM

## 2024-09-09 ENCOUNTER — OUTPATIENT (OUTPATIENT)
Dept: OUTPATIENT SERVICES | Facility: HOSPITAL | Age: 44
LOS: 1 days | End: 2024-09-09
Payer: COMMERCIAL

## 2024-09-09 ENCOUNTER — APPOINTMENT (OUTPATIENT)
Dept: BARIATRICS/WEIGHT MGMT | Facility: CLINIC | Age: 44
End: 2024-09-09
Payer: COMMERCIAL

## 2024-09-09 VITALS — WEIGHT: 197 LBS | HEIGHT: 63 IN | BODY MASS INDEX: 34.91 KG/M2

## 2024-09-09 DIAGNOSIS — E66.01 MORBID (SEVERE) OBESITY DUE TO EXCESS CALORIES: ICD-10-CM

## 2024-09-09 DIAGNOSIS — R73.03 PREDIABETES.: ICD-10-CM

## 2024-09-09 DIAGNOSIS — I10 ESSENTIAL (PRIMARY) HYPERTENSION: ICD-10-CM

## 2024-09-09 PROCEDURE — 99213 OFFICE O/P EST LOW 20 MIN: CPT | Mod: 95

## 2024-09-09 PROCEDURE — G2211 COMPLEX E/M VISIT ADD ON: CPT | Mod: NC,95

## 2024-09-09 PROCEDURE — G0463: CPT

## 2024-09-09 NOTE — HISTORY OF PRESENT ILLNESS
[FreeTextEntry1] : This is a 43 year old female  being seen obesity followup visit.   Patient's weight unchanged  she started on zebpound 5mg , no poor side effects but minimal appetite suppression   Her children have started school  she plans to start doing more at home exercises and start walking  struggles to prepare food as she will cook for her kids and will not cook 2 separate meals, states she is not a cooker  more ordering this month- typically pizza/pasta

## 2024-09-09 NOTE — ASSESSMENT
[FreeTextEntry1] : Bariatric surgery history: none Obesity co- morbidities: gerd on ppi Comorbidities improved or resolved: none Anti-obesity medications: zepbound Obesity medication side effects: felt generally poor on wegovy   Plan:  focus on whole food, high fiber, lean animal protein avoid packaged processed foods discussed some menu ideas so patient can prep more for herself ie: baked sweet potato, canned beans, microwavable rice  keep food journal  3 structured meals  drink 60ounces of water a day  encouraged regular exercise- start walking and start at home body weight exercises  increase zepbound to 7.5mg   f/u 4-6 weeks

## 2024-09-16 DIAGNOSIS — R73.03 PREDIABETES: ICD-10-CM

## 2024-09-16 DIAGNOSIS — E66.01 MORBID (SEVERE) OBESITY DUE TO EXCESS CALORIES: ICD-10-CM

## 2024-10-08 ENCOUNTER — OUTPATIENT (OUTPATIENT)
Dept: OUTPATIENT SERVICES | Facility: HOSPITAL | Age: 44
LOS: 1 days | End: 2024-10-08
Payer: COMMERCIAL

## 2024-10-08 ENCOUNTER — APPOINTMENT (OUTPATIENT)
Dept: BARIATRICS/WEIGHT MGMT | Facility: CLINIC | Age: 44
End: 2024-10-08
Payer: COMMERCIAL

## 2024-10-08 VITALS — WEIGHT: 193 LBS | HEIGHT: 63 IN | BODY MASS INDEX: 34.2 KG/M2

## 2024-10-08 DIAGNOSIS — I10 ESSENTIAL (PRIMARY) HYPERTENSION: ICD-10-CM

## 2024-10-08 DIAGNOSIS — R73.03 PREDIABETES.: ICD-10-CM

## 2024-10-08 DIAGNOSIS — E66.813 OBESITY, CLASS 3: ICD-10-CM

## 2024-10-08 PROCEDURE — G2211 COMPLEX E/M VISIT ADD ON: CPT | Mod: 95

## 2024-10-08 PROCEDURE — G0463: CPT

## 2024-10-08 PROCEDURE — 99213 OFFICE O/P EST LOW 20 MIN: CPT | Mod: 95

## 2024-10-14 DIAGNOSIS — R73.03 PREDIABETES: ICD-10-CM

## 2024-10-14 DIAGNOSIS — E66.813 OBESITY, CLASS 3: ICD-10-CM

## 2024-11-11 ENCOUNTER — OUTPATIENT (OUTPATIENT)
Dept: OUTPATIENT SERVICES | Facility: HOSPITAL | Age: 44
LOS: 1 days | End: 2024-11-11
Payer: COMMERCIAL

## 2024-11-11 ENCOUNTER — APPOINTMENT (OUTPATIENT)
Dept: BARIATRICS/WEIGHT MGMT | Facility: CLINIC | Age: 44
End: 2024-11-11
Payer: COMMERCIAL

## 2024-11-11 VITALS — HEIGHT: 63 IN | BODY MASS INDEX: 32.6 KG/M2 | WEIGHT: 184 LBS

## 2024-11-11 DIAGNOSIS — R73.03 PREDIABETES: ICD-10-CM

## 2024-11-11 DIAGNOSIS — I10 ESSENTIAL (PRIMARY) HYPERTENSION: ICD-10-CM

## 2024-11-11 DIAGNOSIS — E66.813 OBESITY, CLASS 3: ICD-10-CM

## 2024-11-11 PROCEDURE — G2211 COMPLEX E/M VISIT ADD ON: CPT | Mod: 95

## 2024-11-11 PROCEDURE — G0463: CPT

## 2024-11-11 PROCEDURE — 99213 OFFICE O/P EST LOW 20 MIN: CPT | Mod: 95

## 2024-12-17 ENCOUNTER — APPOINTMENT (OUTPATIENT)
Dept: BARIATRICS/WEIGHT MGMT | Facility: CLINIC | Age: 44
End: 2024-12-17
Payer: COMMERCIAL

## 2024-12-17 ENCOUNTER — OUTPATIENT (OUTPATIENT)
Dept: OUTPATIENT SERVICES | Facility: HOSPITAL | Age: 44
LOS: 1 days | End: 2024-12-17
Payer: COMMERCIAL

## 2024-12-17 VITALS — HEIGHT: 63 IN | BODY MASS INDEX: 32.43 KG/M2 | WEIGHT: 183 LBS

## 2024-12-17 DIAGNOSIS — E66.813 OBESITY, CLASS 3: ICD-10-CM

## 2024-12-17 DIAGNOSIS — I10 ESSENTIAL (PRIMARY) HYPERTENSION: ICD-10-CM

## 2024-12-17 DIAGNOSIS — R73.03 PREDIABETES.: ICD-10-CM

## 2024-12-17 PROCEDURE — G0463: CPT

## 2024-12-17 PROCEDURE — 99213 OFFICE O/P EST LOW 20 MIN: CPT | Mod: 95

## 2024-12-23 DIAGNOSIS — R73.03 PREDIABETES: ICD-10-CM

## 2024-12-23 DIAGNOSIS — E66.813 OBESITY, CLASS 3: ICD-10-CM

## 2025-01-23 ENCOUNTER — APPOINTMENT (OUTPATIENT)
Dept: BARIATRICS/WEIGHT MGMT | Facility: CLINIC | Age: 45
End: 2025-01-23
Payer: COMMERCIAL

## 2025-01-23 ENCOUNTER — OUTPATIENT (OUTPATIENT)
Dept: OUTPATIENT SERVICES | Facility: HOSPITAL | Age: 45
LOS: 1 days | End: 2025-01-23

## 2025-01-23 VITALS — WEIGHT: 182 LBS | BODY MASS INDEX: 32.25 KG/M2 | HEIGHT: 63 IN

## 2025-01-23 DIAGNOSIS — E66.813 OBESITY, CLASS 3: ICD-10-CM

## 2025-01-23 DIAGNOSIS — I10 ESSENTIAL (PRIMARY) HYPERTENSION: ICD-10-CM

## 2025-01-23 DIAGNOSIS — R73.03 PREDIABETES.: ICD-10-CM

## 2025-01-23 PROCEDURE — 99213 OFFICE O/P EST LOW 20 MIN: CPT | Mod: 95

## 2025-03-07 ENCOUNTER — APPOINTMENT (OUTPATIENT)
Dept: BARIATRICS/WEIGHT MGMT | Facility: CLINIC | Age: 45
End: 2025-03-07
Payer: COMMERCIAL

## 2025-03-07 ENCOUNTER — OUTPATIENT (OUTPATIENT)
Dept: OUTPATIENT SERVICES | Facility: HOSPITAL | Age: 45
LOS: 1 days | End: 2025-03-07

## 2025-03-07 VITALS — WEIGHT: 178 LBS | HEIGHT: 63 IN | BODY MASS INDEX: 31.54 KG/M2

## 2025-03-07 DIAGNOSIS — R73.03 PREDIABETES.: ICD-10-CM

## 2025-03-07 DIAGNOSIS — I10 ESSENTIAL (PRIMARY) HYPERTENSION: ICD-10-CM

## 2025-03-07 DIAGNOSIS — E66.813 OBESITY, CLASS 3: ICD-10-CM

## 2025-03-07 PROCEDURE — 99213 OFFICE O/P EST LOW 20 MIN: CPT | Mod: 95

## 2025-03-07 RX ORDER — TIRZEPATIDE 7.5 MG/.5ML
7.5 INJECTION, SOLUTION SUBCUTANEOUS
Qty: 1 | Refills: 1 | Status: ACTIVE | COMMUNITY
Start: 2025-03-07 | End: 1900-01-01

## 2025-03-10 DIAGNOSIS — E66.813 OBESITY, CLASS 3: ICD-10-CM

## 2025-03-10 DIAGNOSIS — R73.03 PREDIABETES: ICD-10-CM

## 2025-04-08 ENCOUNTER — OUTPATIENT (OUTPATIENT)
Dept: OUTPATIENT SERVICES | Facility: HOSPITAL | Age: 45
LOS: 1 days | End: 2025-04-08

## 2025-04-08 ENCOUNTER — APPOINTMENT (OUTPATIENT)
Dept: BARIATRICS/WEIGHT MGMT | Facility: CLINIC | Age: 45
End: 2025-04-08
Payer: COMMERCIAL

## 2025-04-08 VITALS — WEIGHT: 172 LBS | BODY MASS INDEX: 30.48 KG/M2 | HEIGHT: 63 IN

## 2025-04-08 DIAGNOSIS — I10 ESSENTIAL (PRIMARY) HYPERTENSION: ICD-10-CM

## 2025-04-08 DIAGNOSIS — R73.03 PREDIABETES.: ICD-10-CM

## 2025-04-08 DIAGNOSIS — E66.813 OBESITY, CLASS 3: ICD-10-CM

## 2025-04-08 PROCEDURE — 99213 OFFICE O/P EST LOW 20 MIN: CPT | Mod: 95

## 2025-04-16 DIAGNOSIS — E66.813 OBESITY, CLASS 3: ICD-10-CM

## 2025-04-16 DIAGNOSIS — R73.03 PREDIABETES: ICD-10-CM

## 2025-04-22 ENCOUNTER — TRANSCRIPTION ENCOUNTER (OUTPATIENT)
Age: 45
End: 2025-04-22

## 2025-05-13 ENCOUNTER — APPOINTMENT (OUTPATIENT)
Dept: BARIATRICS/WEIGHT MGMT | Facility: CLINIC | Age: 45
End: 2025-05-13
Payer: COMMERCIAL

## 2025-05-13 ENCOUNTER — OUTPATIENT (OUTPATIENT)
Dept: OUTPATIENT SERVICES | Facility: HOSPITAL | Age: 45
LOS: 1 days | End: 2025-05-13

## 2025-05-13 VITALS — BODY MASS INDEX: 30.48 KG/M2 | HEIGHT: 63 IN | WEIGHT: 172 LBS

## 2025-05-13 DIAGNOSIS — R73.03 PREDIABETES.: ICD-10-CM

## 2025-05-13 DIAGNOSIS — E66.813 OBESITY, CLASS 3: ICD-10-CM

## 2025-05-13 DIAGNOSIS — I10 ESSENTIAL (PRIMARY) HYPERTENSION: ICD-10-CM

## 2025-05-13 PROCEDURE — 99213 OFFICE O/P EST LOW 20 MIN: CPT | Mod: 95

## 2025-05-19 DIAGNOSIS — R73.03 PREDIABETES: ICD-10-CM

## 2025-05-19 DIAGNOSIS — E66.813 OBESITY, CLASS 3: ICD-10-CM

## 2025-06-10 ENCOUNTER — APPOINTMENT (OUTPATIENT)
Dept: BARIATRICS/WEIGHT MGMT | Facility: CLINIC | Age: 45
End: 2025-06-10
Payer: COMMERCIAL

## 2025-06-10 ENCOUNTER — OUTPATIENT (OUTPATIENT)
Dept: OUTPATIENT SERVICES | Facility: HOSPITAL | Age: 45
LOS: 1 days | End: 2025-06-10

## 2025-06-10 VITALS — HEIGHT: 63 IN | WEIGHT: 165 LBS | BODY MASS INDEX: 29.23 KG/M2

## 2025-06-10 DIAGNOSIS — I10 ESSENTIAL (PRIMARY) HYPERTENSION: ICD-10-CM

## 2025-06-10 PROCEDURE — 99213 OFFICE O/P EST LOW 20 MIN: CPT | Mod: 95

## 2025-06-19 DIAGNOSIS — R73.03 PREDIABETES: ICD-10-CM

## 2025-06-19 DIAGNOSIS — E66.813 OBESITY, CLASS 3: ICD-10-CM

## 2025-07-15 ENCOUNTER — RX RENEWAL (OUTPATIENT)
Age: 45
End: 2025-07-15

## 2025-07-22 ENCOUNTER — APPOINTMENT (OUTPATIENT)
Dept: BARIATRICS/WEIGHT MGMT | Facility: CLINIC | Age: 45
End: 2025-07-22

## 2025-07-23 ENCOUNTER — TRANSCRIPTION ENCOUNTER (OUTPATIENT)
Age: 45
End: 2025-07-23

## 2025-07-29 ENCOUNTER — OUTPATIENT (OUTPATIENT)
Dept: OUTPATIENT SERVICES | Facility: HOSPITAL | Age: 45
LOS: 1 days | End: 2025-07-29

## 2025-07-29 ENCOUNTER — APPOINTMENT (OUTPATIENT)
Dept: BARIATRICS/WEIGHT MGMT | Facility: CLINIC | Age: 45
End: 2025-07-29
Payer: COMMERCIAL

## 2025-07-29 DIAGNOSIS — E66.813 OBESITY, CLASS 3: ICD-10-CM

## 2025-07-29 DIAGNOSIS — I10 ESSENTIAL (PRIMARY) HYPERTENSION: ICD-10-CM

## 2025-07-29 DIAGNOSIS — R73.03 PREDIABETES.: ICD-10-CM

## 2025-07-29 PROCEDURE — 99212 OFFICE O/P EST SF 10 MIN: CPT | Mod: 95

## 2025-08-05 DIAGNOSIS — E66.813 OBESITY, CLASS 3: ICD-10-CM

## 2025-08-05 DIAGNOSIS — R73.03 PREDIABETES: ICD-10-CM

## 2025-09-16 ENCOUNTER — APPOINTMENT (OUTPATIENT)
Dept: BARIATRICS/WEIGHT MGMT | Facility: CLINIC | Age: 45
End: 2025-09-16
Payer: COMMERCIAL

## 2025-09-16 VITALS — WEIGHT: 159 LBS | HEIGHT: 63 IN | BODY MASS INDEX: 28.17 KG/M2

## 2025-09-16 DIAGNOSIS — E66.813 OBESITY, CLASS 3: ICD-10-CM

## 2025-09-16 DIAGNOSIS — R73.03 PREDIABETES.: ICD-10-CM

## 2025-09-16 PROCEDURE — 99212 OFFICE O/P EST SF 10 MIN: CPT | Mod: 95
